# Patient Record
Sex: MALE | Race: WHITE | Employment: FULL TIME | URBAN - METROPOLITAN AREA
[De-identification: names, ages, dates, MRNs, and addresses within clinical notes are randomized per-mention and may not be internally consistent; named-entity substitution may affect disease eponyms.]

---

## 2021-07-28 ENCOUNTER — APPOINTMENT (OUTPATIENT)
Dept: GENERAL RADIOLOGY | Age: 56
End: 2021-07-28
Payer: COMMERCIAL

## 2021-07-28 ENCOUNTER — HOSPITAL ENCOUNTER (EMERGENCY)
Age: 56
Discharge: HOME | End: 2021-07-28
Payer: COMMERCIAL

## 2021-07-28 VITALS
DIASTOLIC BLOOD PRESSURE: 88 MMHG | SYSTOLIC BLOOD PRESSURE: 132 MMHG | RESPIRATION RATE: 18 BRPM | OXYGEN SATURATION: 99 % | HEART RATE: 71 BPM | TEMPERATURE: 97.8 F

## 2021-07-28 DIAGNOSIS — S39.012A LUMBAR STRAIN, INITIAL ENCOUNTER: Primary | ICD-10-CM

## 2021-07-28 DIAGNOSIS — R23.3 PETECHIAL RASH: ICD-10-CM

## 2021-07-28 PROCEDURE — 99283 EMERGENCY DEPT VISIT LOW MDM: CPT

## 2021-07-28 PROCEDURE — 96372 THER/PROPH/DIAG INJ SC/IM: CPT

## 2021-07-28 PROCEDURE — 6360000002 HC RX W HCPCS: Performed by: PHYSICIAN ASSISTANT

## 2021-07-28 PROCEDURE — 72100 X-RAY EXAM L-S SPINE 2/3 VWS: CPT

## 2021-07-28 RX ORDER — METHOCARBAMOL 500 MG/1
500 TABLET, FILM COATED ORAL EVERY 8 HOURS PRN
Qty: 12 TABLET | Refills: 0 | Status: SHIPPED | OUTPATIENT
Start: 2021-07-28 | End: 2021-08-01

## 2021-07-28 RX ORDER — ORPHENADRINE CITRATE 30 MG/ML
60 INJECTION INTRAMUSCULAR; INTRAVENOUS ONCE
Status: COMPLETED | OUTPATIENT
Start: 2021-07-28 | End: 2021-07-28

## 2021-07-28 RX ORDER — KETOROLAC TROMETHAMINE 30 MG/ML
30 INJECTION, SOLUTION INTRAMUSCULAR; INTRAVENOUS ONCE
Status: COMPLETED | OUTPATIENT
Start: 2021-07-28 | End: 2021-07-28

## 2021-07-28 RX ADMIN — ORPHENADRINE CITRATE 60 MG: 30 INJECTION INTRAMUSCULAR; INTRAVENOUS at 11:20

## 2021-07-28 RX ADMIN — KETOROLAC TROMETHAMINE 30 MG: 30 INJECTION, SOLUTION INTRAMUSCULAR; INTRAVENOUS at 11:20

## 2021-07-28 ASSESSMENT — PAIN DESCRIPTION - PAIN TYPE: TYPE: ACUTE PAIN

## 2021-07-28 ASSESSMENT — PAIN DESCRIPTION - LOCATION: LOCATION: BACK

## 2021-07-28 ASSESSMENT — PAIN SCALES - GENERAL
PAINLEVEL_OUTOF10: 5

## 2021-07-28 ASSESSMENT — PAIN DESCRIPTION - DESCRIPTORS: DESCRIPTORS: ACHING;DULL

## 2021-07-28 ASSESSMENT — PAIN DESCRIPTION - PROGRESSION: CLINICAL_PROGRESSION: GRADUALLY WORSENING

## 2021-07-28 ASSESSMENT — PAIN DESCRIPTION - ORIENTATION: ORIENTATION: LOWER

## 2021-07-28 ASSESSMENT — PAIN DESCRIPTION - ONSET: ONSET: SUDDEN

## 2021-07-28 ASSESSMENT — PAIN DESCRIPTION - FREQUENCY: FREQUENCY: CONTINUOUS

## 2021-07-28 NOTE — ED PROVIDER NOTES
**ADVANCED PRACTICE PROVIDER, I HAVE EVALUATED THIS PATIENT**        [address]      Pt Name: Melinda Aparicio  [id number]  Victor Hugogfcorinne 1965  Date of evaluation: 7/28/2021  Provider: Siva Monterroso PA-C      Chief Complaint:    Chief Complaint   Patient presents with    Back Pain     injury monday.  Rash     bilateral lower legs. x1 week. Nursing Notes, Past Medical Hx, Past Surgical Hx, Social Hx, Allergies, and Family Hx were all reviewed and agreed with or any disagreements were addressed in the HPI.    HPI: (Location, Duration, Timing, Severity, Quality, Assoc Sx, Context, Modifying factors)    Chief Complaint of low back pain for the last 2 days. This is a  54 y.o. 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 Patient states that he could feel the muscle start to become tender and tight in the left low back but he could not really quit. He states that he has since then been using some naproxen and trying to rest but pain has not been getting better. Rather it seems now to be affecting more of the low back including into the right low back area. Pain is worse especially with changing position and bending over and better at rest.  He states currently in the bed he has no pain. However with turning or twisting he would have about 5 out of 10 achy and shooting and tight pain. No new onset incontinence or retention of bowel or bladder, saddle numbness or tingling or foot drop. No abdominal pain or vomiting or diarrhea. Patient does mention that he has had a reddish rash on the bilateral anterior shins. For the last couple of weeks or so. He is not really certain what caused it. His wife postulates that it could be from him sweating a lot at work.   Patient states that he works at [employer] and does a lot of lifting and twisting and hot-heavy work. PastMedical/Surgical History:      Diagnosis Date    Hyperlipidemia     Hypertension          Procedure Laterality Date    ABDOMEN SURGERY         Medications:  Previous Medications    AMLODIPINE (NORVASC) 10 MG TABLET    Take 10 mg by mouth daily    BENAZEPRIL (LOTENSIN) 40 MG TABLET    Take 40 mg by mouth daily    NAPROXEN (NAPROSYN) 250 MG TABLET    Take 1 tablet by mouth 2 times daily (with meals) for 14 doses Do not take with ibuprofen or other NSAIDs or anti-inflammatories    PRAVASTATIN (PRAVACHOL) 40 MG TABLET    Take 40 mg by mouth daily         Review of Systems:  (2-9 systems needed)  Review of Systems  Positive straight as above with no fevers or chills cough congestion acute fall contusion or twisting injury headache vision change neck pain or stiffness shortness of breath or chest pain or palpitations. No abdominal pain vomiting or diarrhea. No acute urine or stool changes sediments or tingling or foot drop. No tenderness or oozing or seeping or increased heat from the lower extremity rash. \"Positives and Pertinent negatives as per HPI\"    Physical Exam:  Physical Exam  Vitals and nursing note reviewed. Constitutional:       Appearance: Normal appearance. He is not diaphoretic. HENT:      Head: Normocephalic and atraumatic. Right Ear: External ear normal.      Left Ear: External ear normal.      Nose: Nose normal.   Eyes:      General:         Right eye: No discharge. Left eye: No discharge. Conjunctiva/sclera: Conjunctivae normal.   Cardiovascular:      Rate and Rhythm: Normal rate and regular rhythm. Pulses: Normal pulses. Heart sounds: Normal heart sounds. No murmur heard. No gallop. Pulmonary:      Effort: Pulmonary effort is normal. No respiratory distress. Breath sounds: Normal breath sounds. No wheezing, rhonchi or rales. Abdominal:      General: Bowel sounds are normal. There is no distension.       Palpations: Abdomen is soft. There is no mass. Tenderness: There is no abdominal tenderness. There is no left CVA tenderness, guarding or rebound. Musculoskeletal:         General: No swelling, tenderness, deformity or signs of injury. Normal range of motion. Cervical back: Normal range of motion and neck supple. Back:       Right lower leg: No edema. Left lower leg: No edema. Comments: DTRs 2+ bilaterally at patella. 5 out of 5 dorsiflexion or plantar flexion bilaterally at the ankles and at the large toes. Capillary fill brisk less than 1 second throughout. Distal pulses 2+ bilaterally and radialis as well as dorsalis pedis. There is however some tenderness and tightness particularly in the left paraspinal lumbar musculature and out over the left hip with much less tenderness in the corresponding right lower lumbar musculature. Minimal tenderness over L4-L5 without palpable deformity or increased heat or redness. No sciatic notch tenderness or CVA tenderness. No palpable or observable acute deformity. Skin:     General: Skin is warm and dry. Capillary Refill: Capillary refill takes less than 2 seconds. Findings: Rash present. Comments: Reddish nontender petechial rash present ending in line bilaterally at the proximal shin such as with a potential sock line. No excoriation or oozing or seeping. Neurological:      General: No focal deficit present. Mental Status: He is alert and oriented to person, place, and time. Mental status is at baseline. Psychiatric:         Mood and Affect: Mood normal.         Behavior: Behavior normal.         Thought Content:  Thought content normal.         MEDICAL DECISION MAKING    Vitals:    Vitals:    07/28/21 0949   BP: 138/89   Pulse: 77   Resp: 17   Temp: 97.8 °F (36.6 °C)   TempSrc: Oral   SpO2: 99%       LABS:Labs Reviewed - No data to display     Remainder of labs reviewed and were negative at this time or not returned at the time of this note.    RADIOLOGY:   Non-plain film images such as CT, Ultrasound and MRI are read by the radiologist. Luis A Tran PA-C have directly visualized the radiologic plain film image(s) with the below findings:      Interpretation per the Radiologist below, if available at the time of this note:    XR LUMBAR SPINE (2-3 VIEWS)   Final Result   Lower lumbar spondylosis              XR LUMBAR SPINE (2-3 VIEWS)    Result Date: 7/28/2021  EXAMINATION: THREE XRAY VIEWS OF THE LUMBAR SPINE 7/28/2021 10:26 am COMPARISON: None. HISTORY: ORDERING SYSTEM PROVIDED HISTORY: injury/pain TECHNOLOGIST PROVIDED HISTORY: Reason for exam:->injury/pain Reason for Exam: injury/pain Acuity: Chronic Type of Exam: Ongoing FINDINGS: The lumbar vertebral body heights and alignment are within normal limits. There is mild disc space narrowing at L4-5 with endplate spurring. .  There is no evidence of fracture or bone destruction. The facet joints demonstrate moderate degenerative changes most pronounced in the lower lumbar spine. Lower lumbar spondylosis          MEDICAL DECISION MAKING / ED COURSE:      PROCEDURES:   Procedures    None    Patient was given:  Medications   ketorolac (TORADOL) injection 30 mg (30 mg Intramuscular Given 7/28/21 1120)   orphenadrine (NORFLEX) injection 60 mg (60 mg Intramuscular Given 7/28/21 1120)     This patient presents as above and evaluation and treatment is begun here. The rash as noted above appears to be a petechial type rash. This certainly may have been from too tight of some socks over the area. Warm compresses and conservative care is recommended. Patient agrees. Otherwise history and physical exam findings concerning the back are most consistent with lumbar strain. No acute neurologic deficit such as cauda equina or indication of acute likely bony injury vascular insufficiency or infectious etiology among other severe issues.   In further discussion patient is agreeable to receiving some medication here to help out with pain and spasm and a work note as well as appropriate home care medications and recommendations. Also handouts on back strain and on strengthening exercises will be given to the patient. He is agreeable to the above as well as the following discharge home plan. The patient tolerated their visit well. I evaluated the patient. The physician was available for consultation as needed. The patient and / or the family were informed of the results of any tests, a time was given to answer questions, a plan was proposed and they agreed with plan. Home in stable condition ice the low back 15 to 20 minutes every couple hours as needed the next couple of days, use medications as written, and generally rest, slowly advancing activity as tolerated. May do warm compresses and short socks to help lower extremity lower extremity rash resolution. Follow-up outpatient with family doctor for further care and treatment if needed. Return to the emergency department for any emergency worsening or concern. CLINICAL IMPRESSION:  1. Lumbar strain, initial encounter    2. Petechial rash        DISPOSITION Decision To Discharge 07/28/2021 11:24:38 AM      PATIENT REFERRED TO:  Alem Enriquez MD  [address] [place] [address],[place] [address]  [phone]    Schedule an appointment as soon as possible for a visit   As needed      DISCHARGE MEDICATIONS:  New Prescriptions    DICLOFENAC (VOLTAREN) 50 MG EC TABLET    Take 1 tablet by mouth every 8 hours as needed for Pain (with food)    METHOCARBAMOL (ROBAXIN) 500 MG TABLET    Take 1 tablet by mouth every 8 hours as needed (spasm) Caution, causes drowsiness.   Take appropriate care       DISCONTINUED MEDICATIONS:  Discontinued Medications    No medications on file              (Please note the MDM and HPI sections of this note were completed with a voice recognition program.  Efforts were made to edit the dictations but occasionally words are mis-transcribed.)    Electronically signed, Danielle Pastor PA-C,          Danielle Pastor PA-C  07/28/21 4112

## 2021-07-28 NOTE — ED TRIAGE NOTES
To ED via private car c/o lower back pain since Monday after carrying a casket. C/o red rash to bilateral lower legs x 2 days. Denies itching. Reports it use to be brow.